# Patient Record
Sex: FEMALE | Race: WHITE | HISPANIC OR LATINO | Employment: FULL TIME | URBAN - METROPOLITAN AREA
[De-identification: names, ages, dates, MRNs, and addresses within clinical notes are randomized per-mention and may not be internally consistent; named-entity substitution may affect disease eponyms.]

---

## 2018-06-25 VITALS
WEIGHT: 140 LBS | RESPIRATION RATE: 15 BRPM | BODY MASS INDEX: 26.43 KG/M2 | OXYGEN SATURATION: 100 % | HEART RATE: 95 BPM | SYSTOLIC BLOOD PRESSURE: 135 MMHG | TEMPERATURE: 97.4 F | HEIGHT: 61 IN | DIASTOLIC BLOOD PRESSURE: 80 MMHG

## 2018-06-25 PROCEDURE — 99283 EMERGENCY DEPT VISIT LOW MDM: CPT

## 2018-06-26 ENCOUNTER — HOSPITAL ENCOUNTER (EMERGENCY)
Facility: HOSPITAL | Age: 23
Discharge: HOME | End: 2018-06-26
Attending: EMERGENCY MEDICINE | Admitting: EMERGENCY MEDICINE

## 2018-06-26 DIAGNOSIS — J02.9 PHARYNGITIS, UNSPECIFIED ETIOLOGY: Primary | ICD-10-CM

## 2018-06-26 LAB — S PYO AG THROAT QL: NEGATIVE

## 2018-06-26 PROCEDURE — 99282 EMERGENCY DEPT VISIT SF MDM: CPT | Performed by: EMERGENCY MEDICINE

## 2018-06-26 PROCEDURE — 87081 CULTURE SCREEN ONLY: CPT | Performed by: EMERGENCY MEDICINE

## 2018-06-26 PROCEDURE — 87880 STREP A ASSAY W/OPTIC: CPT | Performed by: EMERGENCY MEDICINE

## 2018-06-26 RX ORDER — ACETAMINOPHEN 500 MG
1000 TABLET ORAL ONCE
Status: COMPLETED | OUTPATIENT
Start: 2018-06-26 | End: 2018-06-26

## 2018-06-26 RX ADMIN — ACETAMINOPHEN 1000 MG: 500 TABLET, FILM COATED ORAL at 00:35

## 2018-06-26 RX ADMIN — LIDOCAINE HYDROCHLORIDE 5 ML: 20 SOLUTION ORAL; TOPICAL at 01:37

## 2018-06-28 LAB — BACTERIA SPEC AEROBE CULT: NORMAL

## 2018-11-21 ENCOUNTER — OFFICE VISIT (OUTPATIENT)
Dept: OBSTETRICS AND GYNECOLOGY | Facility: CLINIC | Age: 23
End: 2018-11-21

## 2018-11-21 VITALS
SYSTOLIC BLOOD PRESSURE: 110 MMHG | BODY MASS INDEX: 27.38 KG/M2 | WEIGHT: 145 LBS | DIASTOLIC BLOOD PRESSURE: 70 MMHG | HEIGHT: 61 IN

## 2018-11-21 DIAGNOSIS — Z01.419 WELL WOMAN EXAM WITH ROUTINE GYNECOLOGICAL EXAM: Primary | ICD-10-CM

## 2018-11-21 DIAGNOSIS — Z01.419 WELL WOMAN EXAM: ICD-10-CM

## 2018-11-21 LAB
B-HCG UR QL: NEGATIVE
BILIRUB BLD-MCNC: NEGATIVE MG/DL
CLARITY, POC: CLEAR
COLOR UR: YELLOW
GLUCOSE UR STRIP-MCNC: NEGATIVE MG/DL
INTERNAL NEGATIVE CONTROL: NEGATIVE
INTERNAL POSITIVE CONTROL: POSITIVE
KETONES UR QL: NEGATIVE
LEUKOCYTE EST, POC: NEGATIVE
Lab: NORMAL
NITRITE UR-MCNC: NEGATIVE MG/ML
PH UR: 5 [PH] (ref 5–8)
PROT UR STRIP-MCNC: NEGATIVE MG/DL
RBC # UR STRIP: NEGATIVE /UL
SP GR UR: 1 (ref 1–1.03)
UROBILINOGEN UR QL: NORMAL

## 2018-11-21 PROCEDURE — 81002 URINALYSIS NONAUTO W/O SCOPE: CPT | Performed by: OBSTETRICS & GYNECOLOGY

## 2018-11-21 PROCEDURE — 99385 PREV VISIT NEW AGE 18-39: CPT | Performed by: OBSTETRICS & GYNECOLOGY

## 2018-11-21 PROCEDURE — 81025 URINE PREGNANCY TEST: CPT | Performed by: OBSTETRICS & GYNECOLOGY

## 2018-11-21 NOTE — PROGRESS NOTES
"GYN Annual Exam     CC- Here for annual exam.     Pt new to practice? YES  Pt new to me? YES           Miriam Riley is a 23 y.o. female who presents for annual well woman exam. Patient's last menstrual period was 11/05/2018.  Doesn't need contraception.      Problems:  There is no problem list on file for this patient.  ; otherwise none    OB History     No data available            History reviewed. No pertinent past medical history.    History reviewed. No pertinent surgical history.      Current Outpatient Medications:   •  lidocaine viscous (XYLOCAINE) 2 % solution, Take 5 mL by mouth 4 (Four) Times a Day As Needed for Mild Pain ., Disp: 100 mL, Rfl: 0    No Known Allergies    Social History     Tobacco Use   • Smoking status: Never Smoker   Substance Use Topics   • Alcohol use: Not on file   • Drug use: Not on file     Counseling given: Not Answered      History reviewed. No pertinent family history.      @ROS@    /70   Ht 154.9 cm (60.98\")   Wt 65.8 kg (145 lb)   LMP 11/05/2018   BMI 27.41 kg/m²     Physical Exam   Constitutional: She is oriented to person, place, and time. She appears well-developed and well-nourished.   HENT:   Head: Normocephalic and atraumatic.   Neck: Normal range of motion. Neck supple. No thyromegaly present.   Cardiovascular: Normal rate and regular rhythm.   Pulmonary/Chest: Effort normal and breath sounds normal. Right breast exhibits no mass and no nipple discharge. Left breast exhibits no mass and no nipple discharge. Breasts are symmetrical. There is no breast swelling.   Abdominal: Soft. Bowel sounds are normal. She exhibits no distension and no mass. There is no tenderness. There is no rebound and no guarding.   Genitourinary: Vagina normal and uterus normal. No breast tenderness, discharge or bleeding. Pelvic exam was performed with patient prone. There is no lesion on the right labia. There is no lesion on the left labia. Cervix exhibits no motion tenderness and no " discharge. Right adnexum displays no mass. Left adnexum displays no mass.   Genitourinary Comments: cx wnl, pap done   Musculoskeletal: Normal range of motion. She exhibits no edema.   Neurological: She is alert and oriented to person, place, and time.   Skin: Skin is warm and dry.   Breasts wnl bilaterally   Psychiatric: She has a normal mood and affect. Her behavior is normal. Judgment and thought content normal.   Nursing note and vitals reviewed.       As part of wellness and prevention, the following topics were discussed with the patient:  []  Nutrition  []  Physical activity/regular exercise   []  Healthy weight  []  Injury prevention  []  Substance misuse/abuse  []  Sexual behavior  []  STD prevention  []  Contaception  []  Dental health  []  Mental health  []  Immunization  [x]  Encouraged SBE     Counseling and guidance done:  Nutrition, physical activity, healthy weight, injury prevention, misuse of tobacco, alcohol and drugs, sexual behavior and STDs, contraception, dental health, mental health, immunizations breast cancer screening and exams.    Assessment     1) GYN annual well woman exam.   2) PAP done today? yes  3) problems: none       Plan       Follow up prn and one year.    Miriam was seen today for gynecologic exam.    Diagnoses and all orders for this visit:    Well woman exam with routine gynecological exam  -     POC Pregnancy, Urine  -     POC Urinalysis Dipstick  -     Pap IG, Ct-Ng TV Rfx HPV ASCU    Well woman exam        RTO Return in about 1 year (around 11/21/2019) for Annual physical.        Kai Burroughs MD    11/21/2018  11:15 AM

## 2018-11-27 LAB
C TRACH RRNA CVX QL NAA+PROBE: NEGATIVE
CONV .: ABNORMAL
CYTOLOGIST CVX/VAG CYTO: ABNORMAL
CYTOLOGY CVX/VAG DOC THIN PREP: ABNORMAL
DX ICD CODE: ABNORMAL
DX ICD CODE: ABNORMAL
HIV 1 & 2 AB SER-IMP: ABNORMAL
HPV I/H RISK 1 DNA CVX QL PROBE+SIG AMP: POSITIVE
N GONORRHOEA RRNA CVX QL NAA+PROBE: NEGATIVE
OTHER STN SPEC: ABNORMAL
PATH REPORT.FINAL DX SPEC: ABNORMAL
PATHOLOGIST CVX/VAG CYTO: ABNORMAL
RECOM F/U CVX/VAG CYTO: ABNORMAL
STAT OF ADQ CVX/VAG CYTO-IMP: ABNORMAL
T VAGINALIS RRNA SPEC QL NAA+PROBE: NEGATIVE

## 2018-11-28 PROBLEM — R87.810 ASCUS WITH POSITIVE HIGH RISK HPV CERVICAL: Status: ACTIVE | Noted: 2018-11-28

## 2018-11-28 PROBLEM — R87.610 ASCUS WITH POSITIVE HIGH RISK HPV CERVICAL: Status: ACTIVE | Noted: 2018-11-28

## 2019-01-23 ENCOUNTER — OFFICE VISIT (OUTPATIENT)
Dept: OBSTETRICS AND GYNECOLOGY | Facility: CLINIC | Age: 24
End: 2019-01-23

## 2019-01-23 VITALS
DIASTOLIC BLOOD PRESSURE: 64 MMHG | BODY MASS INDEX: 27.38 KG/M2 | WEIGHT: 145 LBS | HEIGHT: 61 IN | SYSTOLIC BLOOD PRESSURE: 100 MMHG

## 2019-01-23 DIAGNOSIS — Z13.9 SCREENING FOR CONDITION: Primary | ICD-10-CM

## 2019-01-23 DIAGNOSIS — R87.610 ASCUS WITH POSITIVE HIGH RISK HPV CERVICAL: ICD-10-CM

## 2019-01-23 DIAGNOSIS — R87.810 ASCUS WITH POSITIVE HIGH RISK HPV CERVICAL: ICD-10-CM

## 2019-01-23 LAB
B-HCG UR QL: NEGATIVE
INTERNAL NEGATIVE CONTROL: NEGATIVE
INTERNAL POSITIVE CONTROL: POSITIVE
Lab: NORMAL

## 2019-01-23 PROCEDURE — 81025 URINE PREGNANCY TEST: CPT | Performed by: OBSTETRICS & GYNECOLOGY

## 2019-01-23 PROCEDURE — 57452 EXAM OF CERVIX W/SCOPE: CPT | Performed by: OBSTETRICS & GYNECOLOGY

## 2019-01-23 NOTE — PROGRESS NOTES
Colposcopy Procedure Note    Indications: Most recent Pap smear showed: ASCUS with POSITIVE high risk HPV.  Prior cervical/vaginal disease: normal exam without visible pathology.  Prior cervical treatment: no treatment.    Procedure Details   The risks and benefits of the procedure and Verbal informed consent obtained.    Speculum placed in vagina and excellent visualization of cervix achieved, cervix swabbed x 3 with acetic acid solution and Lugol's solution     Findings:  Cervix: no visible lesions, no mosaicism, no punctation and no abnormal vasculature; cervix swabbed with Lugol's solution, SCJ visualized 360 degrees without lesions and no biopsies taken.  Vaginal inspection: normal without visible lesions.  Vulvar colposcopy: vulvar colposcopy not performed.    Specimens: none    Complications: none.    PT TOLERATED PROCEDURE WELL.     IMPRESSION:  NEGATIVE, ADEQUATE COLPOSCOPY w/ ASCUS/HPV pap    Plan:  Rpt pap 1 yr.      Kai Burroughs MD  10:01 AM  01/23/19

## 2019-02-06 ENCOUNTER — TRANSCRIBE ORDERS (OUTPATIENT)
Dept: ADMINISTRATIVE | Facility: HOSPITAL | Age: 24
End: 2019-02-06

## 2019-02-06 DIAGNOSIS — K11.8 MIKULICZ'S DISEASE: Primary | ICD-10-CM

## 2019-02-13 ENCOUNTER — HOSPITAL ENCOUNTER (OUTPATIENT)
Dept: CT IMAGING | Facility: HOSPITAL | Age: 24
Discharge: HOME | End: 2019-02-13
Admitting: OTOLARYNGOLOGY

## 2019-02-13 DIAGNOSIS — K11.8 MIKULICZ'S DISEASE: ICD-10-CM

## 2019-02-13 PROCEDURE — 70492 CT SFT TSUE NCK W/O & W/DYE: CPT

## 2019-02-13 PROCEDURE — 25010000002 IOPAMIDOL 61 % SOLUTION: Performed by: OTOLARYNGOLOGY

## 2019-02-13 RX ADMIN — IOPAMIDOL 100 ML: 612 INJECTION, SOLUTION INTRAVENOUS at 08:24

## 2019-04-06 ENCOUNTER — APPOINTMENT (OUTPATIENT)
Dept: CT IMAGING | Facility: HOSPITAL | Age: 24
End: 2019-04-06

## 2019-04-06 ENCOUNTER — HOSPITAL ENCOUNTER (EMERGENCY)
Facility: HOSPITAL | Age: 24
Discharge: HOME | End: 2019-04-06
Attending: EMERGENCY MEDICINE | Admitting: EMERGENCY MEDICINE

## 2019-04-06 VITALS
HEART RATE: 83 BPM | SYSTOLIC BLOOD PRESSURE: 129 MMHG | RESPIRATION RATE: 18 BRPM | OXYGEN SATURATION: 98 % | DIASTOLIC BLOOD PRESSURE: 86 MMHG | BODY MASS INDEX: 30.02 KG/M2 | WEIGHT: 159 LBS | TEMPERATURE: 97.5 F | HEIGHT: 61 IN

## 2019-04-06 DIAGNOSIS — K11.20 SIALOADENITIS: Primary | ICD-10-CM

## 2019-04-06 LAB
ANION GAP SERPL CALCULATED.3IONS-SCNC: 14.4 MMOL/L
BASOPHILS # BLD AUTO: 0.01 10*3/MM3 (ref 0–0.2)
BASOPHILS NFR BLD AUTO: 0.1 % (ref 0–1.5)
BUN BLD-MCNC: 16 MG/DL (ref 6–20)
BUN/CREAT SERPL: 23.9 (ref 7–25)
CALCIUM SPEC-SCNC: 9.1 MG/DL (ref 8.6–10.5)
CHLORIDE SERPL-SCNC: 104 MMOL/L (ref 98–107)
CO2 SERPL-SCNC: 22.6 MMOL/L (ref 22–29)
CREAT BLD-MCNC: 0.67 MG/DL (ref 0.57–1)
DEPRECATED RDW RBC AUTO: 43.9 FL (ref 37–54)
EOSINOPHIL # BLD AUTO: 0.06 10*3/MM3 (ref 0–0.4)
EOSINOPHIL NFR BLD AUTO: 0.6 % (ref 0.3–6.2)
ERYTHROCYTE [DISTWIDTH] IN BLOOD BY AUTOMATED COUNT: 13.5 % (ref 12.3–15.4)
GFR SERPL CREATININE-BSD FRML MDRD: 108 ML/MIN/1.73
GLUCOSE BLD-MCNC: 91 MG/DL (ref 65–99)
HCG SERPL QL: NEGATIVE
HCT VFR BLD AUTO: 41.8 % (ref 34–46.6)
HGB BLD-MCNC: 13.7 G/DL (ref 12–15.9)
IMM GRANULOCYTES # BLD AUTO: 0.02 10*3/MM3 (ref 0–0.05)
IMM GRANULOCYTES NFR BLD AUTO: 0.2 % (ref 0–0.5)
LYMPHOCYTES # BLD AUTO: 3.27 10*3/MM3 (ref 0.7–3.1)
LYMPHOCYTES NFR BLD AUTO: 33.7 % (ref 19.6–45.3)
MCH RBC QN AUTO: 29 PG (ref 26.6–33)
MCHC RBC AUTO-ENTMCNC: 32.8 G/DL (ref 31.5–35.7)
MCV RBC AUTO: 88.6 FL (ref 79–97)
MONOCYTES # BLD AUTO: 0.54 10*3/MM3 (ref 0.1–0.9)
MONOCYTES NFR BLD AUTO: 5.6 % (ref 5–12)
NEUTROPHILS # BLD AUTO: 5.81 10*3/MM3 (ref 1.4–7)
NEUTROPHILS NFR BLD AUTO: 59.8 % (ref 42.7–76)
NRBC BLD AUTO-RTO: 0 /100 WBC (ref 0–0)
PLATELET # BLD AUTO: 308 10*3/MM3 (ref 140–450)
PMV BLD AUTO: 11.8 FL (ref 6–12)
POTASSIUM BLD-SCNC: 3.9 MMOL/L (ref 3.5–5.2)
RBC # BLD AUTO: 4.72 10*6/MM3 (ref 3.77–5.28)
SODIUM BLD-SCNC: 141 MMOL/L (ref 136–145)
WBC NRBC COR # BLD: 9.71 10*3/MM3 (ref 3.4–10.8)

## 2019-04-06 PROCEDURE — 96375 TX/PRO/DX INJ NEW DRUG ADDON: CPT

## 2019-04-06 PROCEDURE — 99283 EMERGENCY DEPT VISIT LOW MDM: CPT

## 2019-04-06 PROCEDURE — 70491 CT SOFT TISSUE NECK W/DYE: CPT

## 2019-04-06 PROCEDURE — 85025 COMPLETE CBC W/AUTO DIFF WBC: CPT | Performed by: EMERGENCY MEDICINE

## 2019-04-06 PROCEDURE — 84703 CHORIONIC GONADOTROPIN ASSAY: CPT | Performed by: EMERGENCY MEDICINE

## 2019-04-06 PROCEDURE — 25010000002 HYDROMORPHONE PER 4 MG: Performed by: EMERGENCY MEDICINE

## 2019-04-06 PROCEDURE — 80048 BASIC METABOLIC PNL TOTAL CA: CPT | Performed by: EMERGENCY MEDICINE

## 2019-04-06 PROCEDURE — 96374 THER/PROPH/DIAG INJ IV PUSH: CPT

## 2019-04-06 PROCEDURE — 25010000002 ONDANSETRON PER 1 MG: Performed by: EMERGENCY MEDICINE

## 2019-04-06 PROCEDURE — 25010000002 DEXAMETHASONE SODIUM PHOSPHATE 10 MG/ML SOLUTION: Performed by: EMERGENCY MEDICINE

## 2019-04-06 PROCEDURE — 25010000002 KETOROLAC TROMETHAMINE PER 15 MG: Performed by: EMERGENCY MEDICINE

## 2019-04-06 PROCEDURE — 0 IOPAMIDOL PER 1 ML: Performed by: EMERGENCY MEDICINE

## 2019-04-06 PROCEDURE — 99282 EMERGENCY DEPT VISIT SF MDM: CPT | Performed by: EMERGENCY MEDICINE

## 2019-04-06 RX ORDER — ONDANSETRON 2 MG/ML
4 INJECTION INTRAMUSCULAR; INTRAVENOUS ONCE
Status: COMPLETED | OUTPATIENT
Start: 2019-04-06 | End: 2019-04-06

## 2019-04-06 RX ORDER — KETOROLAC TROMETHAMINE 30 MG/ML
30 INJECTION, SOLUTION INTRAMUSCULAR; INTRAVENOUS ONCE
Status: COMPLETED | OUTPATIENT
Start: 2019-04-06 | End: 2019-04-06

## 2019-04-06 RX ORDER — CEPHALEXIN 500 MG/1
500 CAPSULE ORAL 2 TIMES DAILY
Qty: 21 CAPSULE | Refills: 0 | Status: SHIPPED | OUTPATIENT
Start: 2019-04-06 | End: 2019-04-17

## 2019-04-06 RX ORDER — ACETAMINOPHEN 500 MG
1000 TABLET ORAL EVERY 6 HOURS PRN
COMMUNITY
End: 2019-11-26

## 2019-04-06 RX ORDER — DEXAMETHASONE SODIUM PHOSPHATE 10 MG/ML
10 INJECTION INTRAMUSCULAR; INTRAVENOUS ONCE
Status: COMPLETED | OUTPATIENT
Start: 2019-04-06 | End: 2019-04-06

## 2019-04-06 RX ORDER — SODIUM CHLORIDE 9 MG/ML
INJECTION, SOLUTION INTRAVENOUS
Status: DISPENSED
Start: 2019-04-06 | End: 2019-04-06

## 2019-04-06 RX ORDER — HYDROCODONE BITARTRATE AND ACETAMINOPHEN 5; 325 MG/1; MG/1
TABLET ORAL
Qty: 20 TABLET | Refills: 0 | Status: SHIPPED | OUTPATIENT
Start: 2019-04-06 | End: 2019-11-26

## 2019-04-06 RX ORDER — HYDROMORPHONE HCL 110MG/55ML
1 PATIENT CONTROLLED ANALGESIA SYRINGE INTRAVENOUS ONCE
Status: COMPLETED | OUTPATIENT
Start: 2019-04-06 | End: 2019-04-06

## 2019-04-06 RX ADMIN — DEXAMETHASONE SODIUM PHOSPHATE 10 MG: 10 INJECTION INTRAMUSCULAR; INTRAVENOUS at 08:05

## 2019-04-06 RX ADMIN — IOPAMIDOL 100 ML: 755 INJECTION, SOLUTION INTRAVENOUS at 08:40

## 2019-04-06 RX ADMIN — HYDROMORPHONE HYDROCHLORIDE 1 MG: 2 INJECTION, SOLUTION INTRAMUSCULAR; INTRAVENOUS; SUBCUTANEOUS at 07:52

## 2019-04-06 RX ADMIN — SODIUM CHLORIDE 1000 ML: 9 INJECTION, SOLUTION INTRAVENOUS at 07:47

## 2019-04-06 RX ADMIN — KETOROLAC TROMETHAMINE 30 MG: 30 INJECTION, SOLUTION INTRAMUSCULAR at 08:53

## 2019-04-06 RX ADMIN — ONDANSETRON 4 MG: 2 INJECTION, SOLUTION INTRAMUSCULAR; INTRAVENOUS at 07:47

## 2019-04-06 NOTE — DISCHARGE INSTRUCTIONS
Drink plenty of fluids.  Follow-up with Dr. Brown on Monday.  Return to the emergency department if there is worsening pain, fever, shortness of breath, difficulty swallowing or speaking, worse in any way at all.  Take Motrin as needed as directed for pain and inflammation.  Take the Norco if the ibuprofen does not control your pain.  Take Colace as needed as directed when you are taking the Norco for constipation.

## 2019-04-06 NOTE — ED PROVIDER NOTES
Subjective   History of Present Illness  History of Present Illness    Chief complaint: Swelling of the neck and pain    Location: Under the right mandible    Quality/Severity: Severe, sharp    Timing/Onset/Duration: Gradual onset since Thursday    Modifying Factors: It hurts to open her mouth and press under the right side of the mandible under the right portion of the mandible    Associated Symptoms: No fever or chills.  No nausea or vomiting.  No shortness of breath.  No drainage.    Narrative: This 23-year-old  female had stones removed from her salivary gland on Thursday.  This was performed by a Dr. Alexy Brown.  Patient presents with swelling of the right side of the mandible.  Patient attempted to call the surgeon to perform the procedure and there was no answer.  Patient has a follow-up appointment at the [facility] office on Wednesday, April 17 at 8:30 AM.      Review of Systems   Constitutional: Negative for chills and fever.   HENT: Positive for sore throat. Negative for trouble swallowing and voice change.    Respiratory: Negative for shortness of breath.    Neurological: Negative for headaches.        Medication List      ASK your doctor about these medications    acetaminophen 500 MG tablet  Commonly known as:  TYLENOL     lidocaine viscous 2 % solution  Commonly known as:  XYLOCAINE  Take 5 mL by mouth 4 (Four) Times a Day As Needed for Mild Pain .          History reviewed. No pertinent past medical history.    No Known Allergies    Past Surgical History:   Procedure Laterality Date   • SALIVARY GLAND EXCISION         Family History   Problem Relation Age of Onset   • Hypertension Mother    • Diabetes Father    • No Known Problems Sister    • No Known Problems Brother    • No Known Problems Son    • No Known Problems Daughter    • No Known Problems Maternal Grandmother    • No Known Problems Maternal Grandfather    • No Known Problems Paternal Grandmother    • No Known Problems Paternal  Grandfather    • No Known Problems Cousin        Social History     Socioeconomic History   • Marital status:      Spouse name: Not on file   • Number of children: Not on file   • Years of education: Not on file   • Highest education level: Not on file   Tobacco Use   • Smoking status: Never Smoker   • Smokeless tobacco: Never Used   Substance and Sexual Activity   • Alcohol use: No     Frequency: Never   • Drug use: No   • Sexual activity: Defer           Objective   Physical Exam   Constitutional: She is oriented to person, place, and time. She appears well-developed and well-nourished. She appears distressed (Patient appears to be in pain).   ED Triage Vitals  Temp: 97.5 °F (36.4 °C) (04/06/19 0705)  Heart Rate: 83 (04/06/19 0705)  Resp: 18 (04/06/19 0705)  BP: 133/89 (04/06/19 0705)  SpO2: 98 % (04/06/19 0709)  Temp src: Oral (04/06/19 0705)  Heart Rate Source: Monitor (04/06/19 0705)  Patient Position: Sitting (04/06/19 0705)  BP Location: Right arm (04/06/19 0705)  FiO2 (%): n/a    The patient's vitals were reviewed by me.  Unless otherwise noted they are within normal limits.     HENT:   Head: Normocephalic.   Right Ear: External ear normal.   Left Ear: External ear normal.   Nose: Nose normal.   Mouth/Throat: Oropharynx is clear and moist. No oropharyngeal exudate.   Neck:   There is tenderness and swelling under the right mandible.  The patient appears to have trismus.   Pulmonary/Chest: Effort normal and breath sounds normal. No stridor. No respiratory distress. She has no wheezes. She has no rales. She exhibits no tenderness.   Neurological: She is alert and oriented to person, place, and time.   Skin: Skin is warm and dry.   Nursing note and vitals reviewed.      Procedures           ED Course  ED Course as of Apr 06 0853   Sat Apr 06, 2019   0842 Laboratory values were reviewed by me.  They are unremarkable.  [RC]      ED Course User Index  [RC] Bandar Odom MD      9:53 AM, 04/06/19:  The  patient was reassessed.  She feels better.  Her vital signs were reviewed and are stable.  There is no stridor.  The patient is able to open and close her mouth and tolerate clear liquids.  There is no shortness of breath.    9:53 AM, 04/06/19:  The patient's diagnosis of sialoadenitis was discussed with her.  The patient should take ibuprofen as needed as directed for pain.  She should take the Norco as needed as directed if the ibuprofen does not control her pain.  She should take her antibiotics as prescribed.  The patient should follow-up with Dr. Brown on Monday.  The patient should return to the emergency department if there is worsening pain, shortness of breath, difficulty swallowing or speaking, worse in any way at all.  All the patient's questions were answered she will be discharged in good condition.            MDM    CT Soft Tissue Neck With Contrast    (Results Pending)     Labs Reviewed   BASIC METABOLIC PANEL   CBC WITH AUTO DIFFERENTIAL   HCG, SERUM, QUALITATIVE   CBC AND DIFFERENTIAL    Narrative:     The following orders were created for panel order CBC & Differential.  Procedure                               Abnormality         Status                     ---------                               -----------         ------                     CBC Auto Differential[[id number]]                                                         Please view results for these tests on the individual orders.     No results found.    Final diagnoses:   Sialoadenitis         ED Medications:  Medications   sodium chloride 0.9 % bolus 1,000 mL (not administered)   HYDROmorphone (DILAUDID) injection 1 mg (not administered)   ondansetron (ZOFRAN) injection 4 mg (not administered)       New Medications:     Medication List      ASK your doctor about these medications    acetaminophen 500 MG tablet  Commonly known as:  TYLENOL     lidocaine viscous 2 % solution  Commonly known as:  XYLOCAINE  Take 5 mL by mouth 4 (Four)  Times a Day As Needed for Mild Pain .          Stopped Medications:     Medication List      ASK your doctor about these medications    acetaminophen 500 MG tablet  Commonly known as:  TYLENOL     lidocaine viscous 2 % solution  Commonly known as:  XYLOCAINE  Take 5 mL by mouth 4 (Four) Times a Day As Needed for Mild Pain .            Final diagnoses:   Sialoadenitis            Bandar Odom MD  04/06/19 0915

## 2019-11-26 ENCOUNTER — OFFICE VISIT (OUTPATIENT)
Dept: OBSTETRICS AND GYNECOLOGY | Facility: CLINIC | Age: 24
End: 2019-11-26

## 2019-11-26 VITALS
SYSTOLIC BLOOD PRESSURE: 110 MMHG | BODY MASS INDEX: 28.89 KG/M2 | HEIGHT: 61 IN | WEIGHT: 153 LBS | DIASTOLIC BLOOD PRESSURE: 70 MMHG

## 2019-11-26 DIAGNOSIS — Z01.419 WELL WOMAN EXAM: ICD-10-CM

## 2019-11-26 DIAGNOSIS — Z01.419 ROUTINE GYNECOLOGICAL EXAMINATION: ICD-10-CM

## 2019-11-26 DIAGNOSIS — Z01.419 PAP SMEAR, LOW-RISK: Primary | ICD-10-CM

## 2019-11-26 DIAGNOSIS — Z11.51 SPECIAL SCREENING EXAMINATION FOR HUMAN PAPILLOMAVIRUS (HPV): ICD-10-CM

## 2019-11-26 LAB
B-HCG UR QL: NEGATIVE
BILIRUB BLD-MCNC: NEGATIVE MG/DL
CLARITY, POC: CLEAR
COLOR UR: YELLOW
GLUCOSE UR STRIP-MCNC: NEGATIVE MG/DL
INTERNAL NEGATIVE CONTROL: NEGATIVE
INTERNAL POSITIVE CONTROL: POSITIVE
KETONES UR QL: NEGATIVE
LEUKOCYTE EST, POC: NEGATIVE
Lab: 3215
NITRITE UR-MCNC: NEGATIVE MG/ML
PH UR: 5 [PH] (ref 5–8)
PROT UR STRIP-MCNC: NEGATIVE MG/DL
RBC # UR STRIP: NEGATIVE /UL
SP GR UR: 1 (ref 1–1.03)
UROBILINOGEN UR QL: NORMAL

## 2019-11-26 PROCEDURE — 81002 URINALYSIS NONAUTO W/O SCOPE: CPT | Performed by: OBSTETRICS & GYNECOLOGY

## 2019-11-26 PROCEDURE — 81025 URINE PREGNANCY TEST: CPT | Performed by: OBSTETRICS & GYNECOLOGY

## 2019-11-26 PROCEDURE — 99395 PREV VISIT EST AGE 18-39: CPT | Performed by: OBSTETRICS & GYNECOLOGY

## 2019-11-26 NOTE — PROGRESS NOTES
"GYN Annual Exam     CC- Here for annual exam.     Pt new to practice? No  Pt new to me? No     HPI: History of Present Illness      Miriam Riley is a 24 y.o. female who presents for annual well woman exam. Patient's last menstrual period was 11/11/2019.    Problems in addition to need for annual: none.  Had abnormal pap last year with neg colposcopy.     MAMMOGRAM UP TO DATE IF AGE APPROPRIATE?  Yes    COLONOSCOPY UP TO DATE IF AGE APPROPRIATE? No    Fhx breast cancer? No    Fhx ovarian cancer? No    Fhx colon cancer? No    Invitae testing offered? No      PMHX:  Patient Active Problem List   Diagnosis   • ASCUS with positive high risk HPV cervical   ; otherwise none    OB History     No data available            History reviewed. No pertinent past medical history.    Past Surgical History:   Procedure Laterality Date   • SALIVARY GLAND EXCISION         No current outpatient medications on file.    No Known Allergies    Social History     Tobacco Use   • Smoking status: Never Smoker   • Smokeless tobacco: Never Used   Substance Use Topics   • Alcohol use: No     Frequency: Never   • Drug use: No       Smoker: No    Family History   Problem Relation Age of Onset   • Hypertension Mother    • Diabetes Father    • No Known Problems Sister    • No Known Problems Brother    • No Known Problems Son    • No Known Problems Daughter    • No Known Problems Maternal Grandmother    • No Known Problems Maternal Grandfather    • No Known Problems Paternal Grandmother    • No Known Problems Paternal Grandfather    • No Known Problems Cousin        Review of Systems        EXAM:  /70   Ht 154.9 cm (61\")   Wt 69.4 kg (153 lb)   LMP 11/11/2019   Breastfeeding? No   BMI 28.91 kg/m²     Physical Exam   Constitutional: She is oriented to person, place, and time. She appears well-developed and well-nourished. No distress.   HENT:   Head: Normocephalic and atraumatic.   Eyes: EOM are normal.   Neck: Normal range of motion. "   Cardiovascular: Normal rate.   Pulmonary/Chest: Effort normal.   Abdominal: Soft. She exhibits no distension and no mass. There is no tenderness. There is no guarding.   Genitourinary: Vagina normal and uterus normal. No vaginal discharge found.   Genitourinary Comments: cx wnl, pap done   Musculoskeletal: Normal range of motion. She exhibits no edema, tenderness or deformity.   Neurological: She is alert and oriented to person, place, and time.   Skin: Skin is warm and dry. No rash noted. She is not diaphoretic. No erythema. No pallor.   Breasts wnl bilaterally   Psychiatric: She has a normal mood and affect. Her behavior is normal. Judgment and thought content normal.   Nursing note and vitals reviewed.         As part of wellness and prevention, the following topics were discussed with the patient:  []  Nutrition  []  Physical activity/regular exercise   [x]  Healthy weight  []  Injury prevention  [x]  Substance misuse/abuse  []  Sexual behavior  []  STD prevention  []  Contaception  []  Dental health  [x]  Mental health  []  Immunization  [x]  Encouraged SBE     Counseling and guidance done:  Nutrition, physical activity, healthy weight, injury prevention, misuse of tobacco, alcohol and drugs, sexual behavior and STDs, contraception, dental health, mental health, immunizations breast cancer screening and exams.    Assessment     1) GYN annual well woman exam.   2) PAP done today? Yes  3) problems addressed: none       Plan       Follow up prn or one year.    Miriam was seen today for gynecologic exam.    Diagnoses and all orders for this visit:    Pap smear, low-risk  -     POC Urinalysis Dipstick  -     POC Pregnancy, Urine  -     Pap IG, Ct-Ng TV Rfx HPV ASCU    Routine gynecological examination  -     POC Urinalysis Dipstick  -     POC Pregnancy, Urine  -     Pap IG, Ct-Ng TV Rfx HPV ASCU    Special screening examination for human papillomavirus (HPV)  -     POC Urinalysis Dipstick  -     POC Pregnancy,  Urine  -     Pap IG, Ct-Ng TV Rfx HPV ASCU    Well woman exam        RTO Return in about 1 year (around 11/26/2020) for Annual physical.        Kai Burroughs MD  [unfilled]  9:55 AM

## 2019-12-02 LAB
C TRACH RRNA CVX QL NAA+PROBE: NEGATIVE
CONV .: ABNORMAL
CYTOLOGIST CVX/VAG CYTO: ABNORMAL
CYTOLOGY CVX/VAG DOC CYTO: ABNORMAL
CYTOLOGY CVX/VAG DOC THIN PREP: ABNORMAL
DX ICD CODE: ABNORMAL
DX ICD CODE: ABNORMAL
HIV 1 & 2 AB SER-IMP: ABNORMAL
N GONORRHOEA RRNA CVX QL NAA+PROBE: NEGATIVE
OTHER STN SPEC: ABNORMAL
PATHOLOGIST CVX/VAG CYTO: ABNORMAL
RECOM F/U CVX/VAG CYTO: ABNORMAL
STAT OF ADQ CVX/VAG CYTO-IMP: ABNORMAL
T VAGINALIS RRNA SPEC QL NAA+PROBE: NEGATIVE

## 2019-12-08 PROBLEM — R87.612 LGSIL ON PAP SMEAR OF CERVIX: Status: ACTIVE | Noted: 2019-12-08

## 2020-01-07 ENCOUNTER — OFFICE VISIT (OUTPATIENT)
Dept: OBSTETRICS AND GYNECOLOGY | Facility: CLINIC | Age: 25
End: 2020-01-07

## 2020-01-07 VITALS
HEIGHT: 61 IN | SYSTOLIC BLOOD PRESSURE: 110 MMHG | BODY MASS INDEX: 28.7 KG/M2 | WEIGHT: 152 LBS | DIASTOLIC BLOOD PRESSURE: 70 MMHG

## 2020-01-07 DIAGNOSIS — R87.810 ASCUS WITH POSITIVE HIGH RISK HPV CERVICAL: ICD-10-CM

## 2020-01-07 DIAGNOSIS — R87.612 LOW GRADE SQUAMOUS INTRAEPITHELIAL LESION ON CYTOLOGIC SMEAR OF CERVIX (LGSIL): Primary | ICD-10-CM

## 2020-01-07 DIAGNOSIS — R87.610 ASCUS WITH POSITIVE HIGH RISK HPV CERVICAL: ICD-10-CM

## 2020-01-07 PROCEDURE — 57452 EXAM OF CERVIX W/SCOPE: CPT | Performed by: OBSTETRICS & GYNECOLOGY

## 2020-01-07 PROCEDURE — 81025 URINE PREGNANCY TEST: CPT | Performed by: OBSTETRICS & GYNECOLOGY

## 2020-01-07 NOTE — PROGRESS NOTES
Colposcopy Procedure Note    Indications: Most recent Pap smear showed: ASCUS with POSITIVE high risk HPV.  Prior cervical/vaginal disease: unsure.  Prior cervical treatment: no treatment.    Procedure Details   The risks and benefits of the procedure and Verbal informed consent obtained.    Speculum placed in vagina and excellent visualization of cervix achieved, cervix swabbed x 3 with acetic acid solution and Lugol's solution     Findings:  Cervix: no visible lesions, no mosaicism, no punctation and no abnormal vasculature; cervix swabbed with Lugol's solution, SCJ visualized 360 degrees without lesions and no biopsies taken.  Vaginal inspection: vaginal colposcopy not performed.  Vulvar colposcopy: vulvar colposcopy not performed.    Specimens: none    Complications: none.    PT TOLERATED PROCEDURE WELL.     IMPRESSION:  NEGATIVE, ADEQUATE COLPOSCOPY  ASCUS, HRHPV, nonsmoker.     Counseled.     Plan:  Rpt pap 1 yr      Kai Burroughs MD  9:59 AM  01/07/20

## 2020-12-08 ENCOUNTER — OFFICE VISIT (OUTPATIENT)
Dept: OBSTETRICS AND GYNECOLOGY | Facility: CLINIC | Age: 25
End: 2020-12-08

## 2020-12-08 VITALS
BODY MASS INDEX: 25.19 KG/M2 | WEIGHT: 151.2 LBS | HEIGHT: 65 IN | DIASTOLIC BLOOD PRESSURE: 64 MMHG | SYSTOLIC BLOOD PRESSURE: 112 MMHG

## 2020-12-08 DIAGNOSIS — Z01.419 WELL WOMAN EXAM: ICD-10-CM

## 2020-12-08 DIAGNOSIS — Z11.51 ENCOUNTER FOR SCREENING FOR HUMAN PAPILLOMAVIRUS (HPV): ICD-10-CM

## 2020-12-08 DIAGNOSIS — Z13.9 SCREENING FOR CONDITION: Primary | ICD-10-CM

## 2020-12-08 DIAGNOSIS — R87.612 LGSIL ON PAP SMEAR OF CERVIX: ICD-10-CM

## 2020-12-08 DIAGNOSIS — Z01.419 PAP SMEAR, LOW-RISK: ICD-10-CM

## 2020-12-08 PROBLEM — R87.810 ASCUS WITH POSITIVE HIGH RISK HPV CERVICAL: Status: RESOLVED | Noted: 2018-11-28 | Resolved: 2020-12-08

## 2020-12-08 PROBLEM — R87.610 ASCUS WITH POSITIVE HIGH RISK HPV CERVICAL: Status: RESOLVED | Noted: 2018-11-28 | Resolved: 2020-12-08

## 2020-12-08 LAB
B-HCG UR QL: NEGATIVE
BILIRUB BLD-MCNC: NEGATIVE MG/DL
CLARITY, POC: CLEAR
COLOR UR: YELLOW
GLUCOSE UR STRIP-MCNC: NEGATIVE MG/DL
INTERNAL NEGATIVE CONTROL: NEGATIVE
INTERNAL POSITIVE CONTROL: POSITIVE
KETONES UR QL: NEGATIVE
LEUKOCYTE EST, POC: NEGATIVE
Lab: 55
NITRITE UR-MCNC: NEGATIVE MG/ML
PH UR: 5 [PH] (ref 5–8)
PROT UR STRIP-MCNC: NEGATIVE MG/DL
RBC # UR STRIP: NEGATIVE /UL
SP GR UR: 1 (ref 1–1.03)
UROBILINOGEN UR QL: NORMAL

## 2020-12-08 PROCEDURE — 99395 PREV VISIT EST AGE 18-39: CPT | Performed by: OBSTETRICS & GYNECOLOGY

## 2020-12-08 PROCEDURE — 81002 URINALYSIS NONAUTO W/O SCOPE: CPT | Performed by: OBSTETRICS & GYNECOLOGY

## 2020-12-08 PROCEDURE — 81025 URINE PREGNANCY TEST: CPT | Performed by: OBSTETRICS & GYNECOLOGY

## 2020-12-08 NOTE — PROGRESS NOTES
"GYN Annual Exam     CC- Here for annual exam.     Pt new to practice? No  Pt new to me? No     Miriam Riley is a 25 y.o. No obstetric history on file. female who presents for annual well woman exam. Patient's last menstrual period was 11/08/2020.    Problems in addition to need for annual: none.    HPI: History of Present Illness    PMHX:  Patient Active Problem List   Diagnosis   • LGSIL on Pap smear of cervix   ; otherwise none    OB History    No obstetric history on file.           History reviewed. No pertinent past medical history.    Past Surgical History:   Procedure Laterality Date   • SALIVARY GLAND EXCISION         No current outpatient medications on file.    No Known Allergies    Social History     Tobacco Use   • Smoking status: Never Smoker   • Smokeless tobacco: Never Used   Substance Use Topics   • Alcohol use: No     Frequency: Never   • Drug use: No       Smoker: No    Family History   Problem Relation Age of Onset   • Hypertension Mother    • Diabetes Father    • No Known Problems Sister    • No Known Problems Brother    • No Known Problems Son    • No Known Problems Daughter    • No Known Problems Maternal Grandmother    • No Known Problems Maternal Grandfather    • No Known Problems Paternal Grandmother    • No Known Problems Paternal Grandfather    • No Known Problems Cousin        Review of Systems        EXAM:  /64   Ht 165.1 cm (65\")   Wt 68.6 kg (151 lb 3.2 oz)   LMP 11/08/2020   Breastfeeding No   BMI 25.16 kg/m²     Physical Exam  Vitals signs and nursing note reviewed. Exam conducted with a chaperone present.   Constitutional:       General: She is not in acute distress.     Appearance: She is well-developed. She is not diaphoretic.   HENT:      Head: Normocephalic and atraumatic.      Nose: Nose normal.   Eyes:      Extraocular Movements: Extraocular movements intact.   Neck:      Musculoskeletal: Normal range of motion.   Cardiovascular:      Rate and Rhythm: Normal " rate.   Pulmonary:      Effort: Pulmonary effort is normal.   Chest:      Breasts: Breasts are symmetrical.         Right: Normal. No mass, nipple discharge, skin change or tenderness.         Left: Normal. No mass, nipple discharge, skin change or tenderness.   Abdominal:      General: There is no distension.      Palpations: Abdomen is soft. There is no mass.      Tenderness: There is no abdominal tenderness. There is no guarding.   Genitourinary:     General: Normal vulva.      Pubic Area: No rash.       Vagina: Normal. No vaginal discharge.      Cervix: Normal.      Uterus: Normal.       Adnexa: Right adnexa normal and left adnexa normal.      Comments: Pap done  Musculoskeletal: Normal range of motion.         General: No tenderness or deformity.   Lymphadenopathy:      Upper Body:      Right upper body: No axillary adenopathy.      Left upper body: No axillary adenopathy.   Skin:     General: Skin is warm and dry.      Coloration: Skin is not pale.      Findings: No erythema or rash.   Neurological:      Mental Status: She is alert and oriented to person, place, and time.   Psychiatric:         Behavior: Behavior normal.         Thought Content: Thought content normal.         Judgment: Judgment normal.            As part of wellness and prevention, the following topics were discussed with the patient:  []  Nutrition  []  Physical activity/regular exercise   [x]  Healthy weight  []  Injury prevention  [x]  Substance misuse/abuse  []  Sexual behavior  []  STD prevention  []  Contaception  []  Dental health  [x]  Mental health  []  Immunization  [x]  Encouraged SBE     Counseling and guidance done:  Nutrition, physical activity, healthy weight, injury prevention, misuse of tobacco, alcohol and drugs, sexual behavior and STDs, contraception, dental health, mental health, immunizations breast cancer screening and exams.    Assessment     1) GYN annual well woman exam.   2) PAP done today? Yes  3) problems  addressed: none               Plan       Follow up prn or one year.    Diagnoses and all orders for this visit:    1. Screening for condition (Primary)  -     POC Urinalysis Dipstick  -     POC Pregnancy, Urine    2. Pap smear, low-risk  -     Pap IG, Ct-Ng TV Rfx HPV ASCU    3. Encounter for screening for human papillomavirus (HPV)  -     Pap IG, Ct-Ng TV Rfx HPV ASCU    4. Well woman exam    5. LGSIL on Pap smear of cervix        RTO Return in about 1 year (around 12/8/2021) for Annual physical.          Kai Burroughs MD  [unfilled]  09:46 EST

## 2020-12-15 LAB
C TRACH RRNA CVX QL NAA+PROBE: NEGATIVE
CONV .: ABNORMAL
CYTOLOGIST CVX/VAG CYTO: ABNORMAL
CYTOLOGY CVX/VAG DOC CYTO: ABNORMAL
CYTOLOGY CVX/VAG DOC THIN PREP: ABNORMAL
DX ICD CODE: ABNORMAL
DX ICD CODE: ABNORMAL
HIV 1 & 2 AB SER-IMP: ABNORMAL
HPV I/H RISK 1 DNA CVX QL PROBE+SIG AMP: POSITIVE
Lab: ABNORMAL
N GONORRHOEA RRNA CVX QL NAA+PROBE: NEGATIVE
OTHER STN SPEC: ABNORMAL
PATHOLOGIST CVX/VAG CYTO: ABNORMAL
RECOM F/U CVX/VAG CYTO: ABNORMAL
STAT OF ADQ CVX/VAG CYTO-IMP: ABNORMAL
T VAGINALIS RRNA SPEC QL NAA+PROBE: NEGATIVE

## 2020-12-28 ENCOUNTER — TELEPHONE (OUTPATIENT)
Dept: OBSTETRICS AND GYNECOLOGY | Facility: CLINIC | Age: 25
End: 2020-12-28

## 2021-06-09 ENCOUNTER — OFFICE VISIT (OUTPATIENT)
Dept: OBSTETRICS AND GYNECOLOGY | Facility: CLINIC | Age: 26
End: 2021-06-09

## 2021-06-09 VITALS
HEIGHT: 61 IN | SYSTOLIC BLOOD PRESSURE: 110 MMHG | DIASTOLIC BLOOD PRESSURE: 62 MMHG | WEIGHT: 152 LBS | BODY MASS INDEX: 28.7 KG/M2

## 2021-06-09 DIAGNOSIS — Z11.51 SPECIAL SCREENING EXAMINATION FOR HUMAN PAPILLOMAVIRUS (HPV): ICD-10-CM

## 2021-06-09 DIAGNOSIS — Z13.89 SCREENING FOR GENITOURINARY CONDITION: Primary | ICD-10-CM

## 2021-06-09 DIAGNOSIS — Z01.419 PAP SMEAR, LOW-RISK: ICD-10-CM

## 2021-06-09 LAB
B-HCG UR QL: NEGATIVE
BILIRUB BLD-MCNC: NEGATIVE MG/DL
CLARITY, POC: CLEAR
COLOR UR: YELLOW
GLUCOSE UR STRIP-MCNC: NEGATIVE MG/DL
INTERNAL NEGATIVE CONTROL: NORMAL
INTERNAL POSITIVE CONTROL: NORMAL
KETONES UR QL: NEGATIVE
LEUKOCYTE EST, POC: NEGATIVE
Lab: NORMAL
NITRITE UR-MCNC: NEGATIVE MG/ML
PH UR: 5 [PH] (ref 5–8)
PROT UR STRIP-MCNC: NEGATIVE MG/DL
RBC # UR STRIP: NEGATIVE /UL
SP GR UR: 1 (ref 1–1.03)
UROBILINOGEN UR QL: NORMAL

## 2021-06-09 PROCEDURE — 81025 URINE PREGNANCY TEST: CPT | Performed by: OBSTETRICS & GYNECOLOGY

## 2021-06-09 PROCEDURE — 99212 OFFICE O/P EST SF 10 MIN: CPT | Performed by: OBSTETRICS & GYNECOLOGY

## 2021-06-09 PROCEDURE — 81002 URINALYSIS NONAUTO W/O SCOPE: CPT | Performed by: OBSTETRICS & GYNECOLOGY

## 2021-06-09 NOTE — PROGRESS NOTES
"Recheck pap.    S:  Pt here for rpt pap.  Prior paps have shown ASCUS/HRHPV.  Neg colpo.  No prior biopsies. Pt is nonsmoker.  Pt is without complaints.     O:  /62   Ht 154.9 cm (61\")   Wt 68.9 kg (152 lb)   LMP 05/27/2021   Breastfeeding No   BMI 28.72 kg/m²     Exam neg.  Pap done.  cx wnl.     A:  Normal cx, hx ASCUS.     P:  Call for results.  If worse than ASCUS, pt may need rpt colpo/bxs.    Kai Burroughs MD  09:33 EDT  06/09/21        "

## 2021-06-15 LAB
C TRACH RRNA CVX QL NAA+PROBE: NEGATIVE
CONV .: ABNORMAL
CYTOLOGIST CVX/VAG CYTO: ABNORMAL
CYTOLOGY CVX/VAG DOC CYTO: ABNORMAL
CYTOLOGY CVX/VAG DOC THIN PREP: ABNORMAL
DX ICD CODE: ABNORMAL
DX ICD CODE: ABNORMAL
HIV 1 & 2 AB SER-IMP: ABNORMAL
HPV I/H RISK 4 DNA CVX QL PROBE+SIG AMP: POSITIVE
N GONORRHOEA RRNA CVX QL NAA+PROBE: NEGATIVE
OTHER STN SPEC: ABNORMAL
PATHOLOGIST CVX/VAG CYTO: ABNORMAL
RECOM F/U CVX/VAG CYTO: ABNORMAL
STAT OF ADQ CVX/VAG CYTO-IMP: ABNORMAL
T VAGINALIS RRNA SPEC QL NAA+PROBE: NEGATIVE

## 2022-06-15 ENCOUNTER — OFFICE VISIT (OUTPATIENT)
Dept: OBSTETRICS AND GYNECOLOGY | Facility: CLINIC | Age: 27
End: 2022-06-15

## 2022-06-15 VITALS
SYSTOLIC BLOOD PRESSURE: 110 MMHG | BODY MASS INDEX: 30.4 KG/M2 | DIASTOLIC BLOOD PRESSURE: 70 MMHG | WEIGHT: 161 LBS | HEIGHT: 61 IN

## 2022-06-15 DIAGNOSIS — Z01.419 PAP SMEAR, LOW-RISK: Primary | ICD-10-CM

## 2022-06-15 DIAGNOSIS — Z01.419 ROUTINE GYNECOLOGICAL EXAMINATION: ICD-10-CM

## 2022-06-15 DIAGNOSIS — R87.612 LGSIL ON PAP SMEAR OF CERVIX: ICD-10-CM

## 2022-06-15 DIAGNOSIS — Z01.419 WELL WOMAN EXAM: ICD-10-CM

## 2022-06-15 LAB
B-HCG UR QL: NEGATIVE
BILIRUB BLD-MCNC: NEGATIVE MG/DL
CLARITY, POC: CLEAR
COLOR UR: YELLOW
EXPIRATION DATE: NORMAL
GLUCOSE UR STRIP-MCNC: NEGATIVE MG/DL
INTERNAL NEGATIVE CONTROL: NORMAL
INTERNAL POSITIVE CONTROL: NORMAL
KETONES UR QL: NEGATIVE
LEUKOCYTE EST, POC: NEGATIVE
Lab: NORMAL
NITRITE UR-MCNC: NEGATIVE MG/ML
PH UR: 5 [PH] (ref 5–8)
PROT UR STRIP-MCNC: NEGATIVE MG/DL
RBC # UR STRIP: NEGATIVE /UL
SP GR UR: 1 (ref 1–1.03)
UROBILINOGEN UR QL: NORMAL

## 2022-06-15 PROCEDURE — 81025 URINE PREGNANCY TEST: CPT | Performed by: OBSTETRICS & GYNECOLOGY

## 2022-06-15 PROCEDURE — 99395 PREV VISIT EST AGE 18-39: CPT | Performed by: OBSTETRICS & GYNECOLOGY

## 2022-06-15 PROCEDURE — 81002 URINALYSIS NONAUTO W/O SCOPE: CPT | Performed by: OBSTETRICS & GYNECOLOGY

## 2022-06-15 NOTE — PROGRESS NOTES
GYN Annual Exam     CC- Here for annual exam   Chief Complaint   Patient presents with   • Gynecologic Exam       Pt new to practice? No  Pt new to me? No     Miriam Riley is a 26 y.o.  female who presents for annual well woman exam. Patient's last menstrual period was 2022.    Problems in addition to need for annual: none    HPI: History of Present Illness    PMHX:  Patient Active Problem List   Diagnosis   • hx: LGSIL on Pap smear of cervix   ; otherwise none    OB History        0    Para   0    Term   0       0    AB   0    Living   0       SAB   0    IAB   0    Ectopic   0    Molar   0    Multiple   0    Live Births   0                  Past Medical History:   Diagnosis Date   • Abnormal Pap smear of cervix    • Varicella        Past Surgical History:   Procedure Laterality Date   • SALIVARY GLAND EXCISION           Current Outpatient Medications:   •  amoxicillin (AMOXIL) 875 MG tablet, Take 1 by mouth twice a day, Disp: 20 tablet, Rfl: 0  •  predniSONE (DELTASONE) 50 MG tablet, Take 1 tablet po every morning, Disp: 5 tablet, Rfl: 0    No Known Allergies    Social History     Tobacco Use   • Smoking status: Never Smoker   • Smokeless tobacco: Never Used   Vaping Use   • Vaping Use: Never used   Substance Use Topics   • Alcohol use: No   • Drug use: No       Miriam Riley  reports that she has never smoked. She has never used smokeless tobacco.           Family History   Problem Relation Age of Onset   • Hypertension Mother    • Diabetes Mother    • Diabetes Father    • No Known Problems Sister    • No Known Problems Brother    • No Known Problems Son    • No Known Problems Daughter    • No Known Problems Maternal Grandmother    • No Known Problems Maternal Grandfather    • No Known Problems Paternal Grandmother    • No Known Problems Paternal Grandfather    • No Known Problems Cousin        Review of Systems    Patient reports that she is not currently experiencing any symptoms  "of urinary incontinence.      noTESTED FOR CHLAMYDIA?    EXAM:  /70   Ht 154.9 cm (61\")   Wt 73 kg (161 lb)   LMP 06/07/2022   Breastfeeding No   BMI 30.42 kg/m²     Labs:   Lab Results (last 24 hours)     Procedure Component Value Units Date/Time    POC Urinalysis Dipstick [[id number]] Collected: 06/15/22 0923    Specimen: Urine Updated: 06/15/22 0925     Color Yellow     Clarity, UA Clear     Glucose, UA Negative mg/dL      Bilirubin Negative     Ketones, UA Negative     Specific Gravity  1.005     Blood, UA Negative     pH, Urine 5.0     Protein, POC Negative mg/dL      Urobilinogen, UA Normal     Leukocytes Negative     Nitrite, UA Negative    POC Pregnancy, Urine [[id number]] Collected: 06/15/22 0923    Specimen: Urine Updated: 06/15/22 0924     HCG, Urine, QL Negative     Lot Number [id number]     Internal Positive Control Passed     Internal Negative Control Passed     Expiration Date 7/31/23          Physical Exam  Vitals and nursing note reviewed. Exam conducted with a chaperone present.   Constitutional:       General: She is not in acute distress.     Appearance: She is well-developed. She is not diaphoretic.   HENT:      Head: Normocephalic and atraumatic.      Nose: Nose normal.   Eyes:      Extraocular Movements: Extraocular movements intact.   Cardiovascular:      Rate and Rhythm: Normal rate.   Pulmonary:      Effort: Pulmonary effort is normal.   Chest:   Breasts: Breasts are symmetrical.      Right: Normal. No mass, nipple discharge, skin change, tenderness or axillary adenopathy.      Left: Normal. No mass, nipple discharge, skin change, tenderness or axillary adenopathy.       Abdominal:      General: There is no distension.      Palpations: Abdomen is soft. There is no mass.      Tenderness: There is no abdominal tenderness. There is no guarding.   Genitourinary:     General: Normal vulva.      Pubic Area: No rash.       Vagina: Normal. No vaginal discharge.      Cervix: Normal.      " Uterus: Normal.       Adnexa: Right adnexa normal and left adnexa normal.   Musculoskeletal:         General: No tenderness or deformity. Normal range of motion.      Cervical back: Normal range of motion.   Lymphadenopathy:      Upper Body:      Right upper body: No axillary adenopathy.      Left upper body: No axillary adenopathy.   Skin:     General: Skin is warm and dry.      Coloration: Skin is not pale.      Findings: No erythema or rash.   Neurological:      Mental Status: She is alert and oriented to person, place, and time.   Psychiatric:         Behavior: Behavior normal.         Thought Content: Thought content normal.         Judgment: Judgment normal.            As part of wellness and prevention, the following topics were discussed with the patient: healthy weight, substance abuse/misuse, mental health, encouraging self breast exam, and other counseling and guidance done:  Nutrition, physical activity, healthy weight, injury prevention, misuse of tobacco, alcohol and drugs, sexual behavior and STDs, contraception, dental health, mental health, immunizations breast cancer screening and exams.    I saw the patient with a face mask, gloves and eye protection  The patient herself was masked.  Social distancing was observed as appropriate. All COVID precautions observed.  Pt encouraged to receive COVID vaccine if she hadn't already done this.     Assessment     1) GYN annual well woman exam.   2) PAP done today? Yes  3) problems addressed: none             Plan       Follow up prn or one year.    Pt instructed to call for results of any testing done today and that failure to call if she has not heard from us could result in a bad outcome.  Pt verbalized her understanding.     Diagnoses and all orders for this visit:    1. Pap smear, low-risk (Primary)  -     POC Urinalysis Dipstick  -     POC Pregnancy, Urine  -     IGP, Rfx Aptima HPV ASCU    2. Routine gynecological examination  -     POC Urinalysis  Dipstick  -     POC Pregnancy, Urine  -     IGP, Rfx Aptima HPV ASCU    3. Well woman exam    4. hx: LGSIL on Pap smear of cervix        RTO Return in about 1 year (around 6/15/2023) for Annual physical.        Kai Burroughs MD  [unfilled]  09:43 EDT

## 2022-06-19 LAB
CONV .: NORMAL
CYTOLOGIST CVX/VAG CYTO: NORMAL
CYTOLOGY CVX/VAG DOC CYTO: NORMAL
CYTOLOGY CVX/VAG DOC THIN PREP: NORMAL
DX ICD CODE: NORMAL
HIV 1 & 2 AB SER-IMP: NORMAL
Lab: NORMAL
OTHER STN SPEC: NORMAL
STAT OF ADQ CVX/VAG CYTO-IMP: NORMAL

## 2022-11-09 ENCOUNTER — TRANSCRIBE ORDERS (OUTPATIENT)
Dept: ADMINISTRATIVE | Facility: HOSPITAL | Age: 27
End: 2022-11-09

## 2022-11-09 DIAGNOSIS — R74.8 ELEVATED LIVER ENZYMES: Primary | ICD-10-CM

## 2022-11-21 ENCOUNTER — HOSPITAL ENCOUNTER (OUTPATIENT)
Dept: ULTRASOUND IMAGING | Facility: HOSPITAL | Age: 27
Discharge: HOME | End: 2022-11-21
Admitting: FAMILY MEDICINE

## 2022-11-21 DIAGNOSIS — R74.8 ELEVATED LIVER ENZYMES: ICD-10-CM

## 2022-11-21 PROCEDURE — 76705 ECHO EXAM OF ABDOMEN: CPT

## 2023-04-12 ENCOUNTER — TELEPHONE (OUTPATIENT)
Dept: OBSTETRICS AND GYNECOLOGY | Facility: CLINIC | Age: 28
End: 2023-04-12

## 2023-04-12 NOTE — TELEPHONE ENCOUNTER
Caller: Miriam Riley    Relationship: Self    Best call back number: [phone]    What was the call regarding: PT CALLED IN AND STATED HER LMP: 02/14. PT HAS BEEN TAKING PREGNANCY TESTS AND GOTTEN NEGATIVE RESULTS.     PT REQUESTING LAB WORK.     Do you require a callback: YES

## 2023-07-09 PROBLEM — N97.0 INFERTILITY, ANOVULATION: Status: ACTIVE | Noted: 2023-07-09

## 2023-07-09 PROBLEM — R63.8 INCREASED BMI: Status: ACTIVE | Noted: 2023-07-09

## 2023-07-09 PROBLEM — E28.2 PCOS (POLYCYSTIC OVARIAN SYNDROME): Status: ACTIVE | Noted: 2023-07-09

## 2023-07-09 PROBLEM — E88.81 INSULIN RESISTANCE: Status: ACTIVE | Noted: 2023-07-09

## 2023-07-09 PROBLEM — N91.5 OLIGOMENORRHEA: Status: ACTIVE | Noted: 2023-07-09

## 2023-07-09 PROBLEM — E88.819 INSULIN RESISTANCE: Status: ACTIVE | Noted: 2023-07-09

## 2023-07-09 PROBLEM — E34.9 FEMALE HYPERTESTOSTERONEMIA: Status: ACTIVE | Noted: 2023-07-09

## 2023-09-08 ENCOUNTER — OFFICE VISIT (OUTPATIENT)
Dept: OBSTETRICS AND GYNECOLOGY | Facility: CLINIC | Age: 28
End: 2023-09-08
Payer: COMMERCIAL

## 2023-09-08 VITALS
BODY MASS INDEX: 31.42 KG/M2 | SYSTOLIC BLOOD PRESSURE: 118 MMHG | WEIGHT: 166.4 LBS | DIASTOLIC BLOOD PRESSURE: 76 MMHG | HEIGHT: 61 IN

## 2023-09-08 DIAGNOSIS — E28.2 PCOS (POLYCYSTIC OVARIAN SYNDROME): Primary | ICD-10-CM

## 2023-09-08 DIAGNOSIS — N92.6 MENSTRUAL CYCLE PROBLEM: ICD-10-CM

## 2023-09-08 LAB
B-HCG UR QL: NEGATIVE
BILIRUB BLD-MCNC: NEGATIVE MG/DL
CLARITY, POC: CLEAR
COLOR UR: YELLOW
EXPIRATION DATE: NORMAL
GLUCOSE UR STRIP-MCNC: NEGATIVE MG/DL
INTERNAL NEGATIVE CONTROL: NEGATIVE
INTERNAL POSITIVE CONTROL: POSITIVE
KETONES UR QL: NEGATIVE
LEUKOCYTE EST, POC: NEGATIVE
Lab: 55
NITRITE UR-MCNC: NEGATIVE MG/ML
PH UR: 5 [PH] (ref 5–8)
PROT UR STRIP-MCNC: NEGATIVE MG/DL
RBC # UR STRIP: NEGATIVE /UL
SP GR UR: 1 (ref 1–1.03)
UROBILINOGEN UR QL: NORMAL

## 2023-09-08 NOTE — PROGRESS NOTES
"Subjective     Chief Complaint   Patient presents with    Menstrual Problem     PIP her labs and u/s and hx indicate she has PCOS.  Will discuss plan when she comes back into the office. Per Dr Burroughs       Miriam Riley is a 28 y.o.  whose LMP is Patient's last menstrual period was 2023 (exact date).. This patient is new to me - yes.  She presents with f/u PCOS     HPI    Recent labs and pelvic US indicate PCOS.  Declined ocp to regulate her cycles.  Currently on Metformin 500 mg BID.  Considering child-bearing in the future.   Strong family hx of diabetes.        The following portions of the patient's history were reviewed and updated as appropriate:vital signs, allergies, current medications, past medical history, past social history, past surgical history, and problem list      Review of Systems     Review of Systems   Genitourinary:  Positive for menstrual problem and pelvic pain (occasional).     Objective      /76   Ht 154.9 cm (60.98\")   Wt 75.5 kg (166 lb 6.4 oz)   LMP 2023 (Exact Date)   BMI 31.46 kg/m²     Physical Exam    Physical Exam  Vitals reviewed.   Constitutional:       General: She is awake. She is not in acute distress.     Appearance: She is not ill-appearing.   Eyes:      Conjunctiva/sclera: Conjunctivae normal.   Pulmonary:      Effort: Pulmonary effort is normal. No respiratory distress.   Musculoskeletal:      Cervical back: Neck supple. No rigidity.   Skin:     General: Skin is warm and dry.      Capillary Refill: Capillary refill takes less than 2 seconds.   Neurological:      Mental Status: She is alert and oriented to person, place, and time.   Psychiatric:         Mood and Affect: Mood and affect normal.         Behavior: Behavior normal.       Lab Review   Labs: Urine pregnancy test, Urinalysis - with micro     Imaging   Ultrasound - Pelvic Vaginal    Assessment  Diagnoses and all orders for this visit:    1. PCOS (polycystic ovarian syndrome) " (Primary)  -     Ambulatory Referral to Endocrinology    2. Menstrual cycle problem  -     POC Urinalysis Dipstick  -     POC Pregnancy, Urine    All questions answered.  Referred to Endocrinology for further E/T.      Return if symptoms worsen or fail to improve.    I spent 30 minutes caring for Miriam on this date of service. This time includes time spent by me in the following activities: preparing for the visit, reviewing tests, obtaining and/or reviewing a separately obtained history, performing a medically appropriate examination and/or evaluation, counseling and educating the patient/family/caregiver, ordering medications, tests, or procedures, referring and communicating with other health care professionals, and documenting information in the medical record     Trish Zuniga, APRN  9/8/2023  13:31 EDT

## 2024-03-26 ENCOUNTER — OFFICE VISIT (OUTPATIENT)
Dept: ENDOCRINOLOGY | Age: 29
End: 2024-03-26
Payer: COMMERCIAL

## 2024-03-26 ENCOUNTER — TELEPHONE (OUTPATIENT)
Dept: ENDOCRINOLOGY | Age: 29
End: 2024-03-26

## 2024-03-26 VITALS
HEIGHT: 61 IN | DIASTOLIC BLOOD PRESSURE: 72 MMHG | OXYGEN SATURATION: 98 % | SYSTOLIC BLOOD PRESSURE: 116 MMHG | TEMPERATURE: 97.6 F | HEART RATE: 90 BPM | WEIGHT: 163 LBS | BODY MASS INDEX: 30.78 KG/M2

## 2024-03-26 DIAGNOSIS — E28.2 PCOS (POLYCYSTIC OVARIAN SYNDROME): Primary | ICD-10-CM

## 2024-03-26 PROCEDURE — 99203 OFFICE O/P NEW LOW 30 MIN: CPT | Performed by: INTERNAL MEDICINE

## 2024-03-26 RX ORDER — ERGOCALCIFEROL 1.25 MG/1
1 CAPSULE ORAL WEEKLY
COMMUNITY
Start: 2024-03-25

## 2024-03-26 NOTE — PROGRESS NOTES
Referring provider: JAY Main     Chief complaint/Reason for consult: PCOS    HPI:   - 28 year old here for PCOS  - She states she had missed a menstrual cycle and that is what prompted her to be evaluated for PCOS  - She states her menstrual cycles are more regular now  - She was started on metformin 500 mg bid  - Denies unwanted hair growth on her face or body    The following portions of the patient's history were reviewed and updated as appropriate: allergies, current medications, past family history, past medical history, past social history, past surgical history, and problem list.    Objective     Vitals:    03/26/24 1553   BP: 116/72   Pulse: 90   Temp: 97.6 °F (36.4 °C)   SpO2: 98%        Physical Exam  Vitals reviewed.   Constitutional:       Appearance: Normal appearance.   HENT:      Head: Normocephalic and atraumatic.   Eyes:      General: No scleral icterus.  Pulmonary:      Effort: Pulmonary effort is normal. No respiratory distress.   Neurological:      Mental Status: She is alert.      Gait: Gait normal.   Psychiatric:         Mood and Affect: Mood normal.         Behavior: Behavior normal.         Thought Content: Thought content normal.         Judgment: Judgment normal.         Labs/Imaging:  Reviewed her lab work and ultrasound which is consistent with PCOS    Assessment & Plan   PCOS  - Discussed that weight loss can result in improvement of PCOS overall  - Cont. Metformin 500 mg bid  - Patient does not want or does not have symptoms that warrant OCP's or spironolactone    - Return to clinic PRN

## 2024-03-26 NOTE — TELEPHONE ENCOUNTER
Hub staff attempted to follow warm transfer process and was unsuccessful     Caller: Miriam Riley    Relationship to patient: Self    Best call back number:     [phone]       Patient is needing PT CALLED WANTING TO TALK WITH DR. RAMÍREZ BEFORE COMING INTO  APT TODAY. PT STATED THAT IT IS SOMETHING PERSONAL THAT HSE IS NEEDING TO SPEAK WITH HIM ABOUT . PLEASE ADVISE.

## 2024-03-26 NOTE — TELEPHONE ENCOUNTER
Called and spoke with pt. Pt wanted to inform us that she started her period this morning and wanted to ensure it is still okay to come to her appointment. I informed the pt that she is still okay to come her appointment today. Pt had no further questions or concerns.

## 2024-03-28 ENCOUNTER — PATIENT ROUNDING (BHMG ONLY) (OUTPATIENT)
Dept: ENDOCRINOLOGY | Age: 29
End: 2024-03-28
Payer: COMMERCIAL

## 2024-07-26 ENCOUNTER — OFFICE VISIT (OUTPATIENT)
Dept: OBSTETRICS AND GYNECOLOGY | Facility: CLINIC | Age: 29
End: 2024-07-26
Payer: COMMERCIAL

## 2024-07-26 VITALS
SYSTOLIC BLOOD PRESSURE: 110 MMHG | DIASTOLIC BLOOD PRESSURE: 64 MMHG | BODY MASS INDEX: 29.27 KG/M2 | WEIGHT: 155 LBS | HEIGHT: 61 IN

## 2024-07-26 DIAGNOSIS — Z01.419 ROUTINE GYNECOLOGICAL EXAMINATION: ICD-10-CM

## 2024-07-26 DIAGNOSIS — Z01.419 PAP SMEAR, AS PART OF ROUTINE GYNECOLOGICAL EXAMINATION: Primary | ICD-10-CM

## 2024-07-26 DIAGNOSIS — Z31.69 ENCOUNTER FOR PRECONCEPTION CONSULTATION: ICD-10-CM

## 2024-07-26 DIAGNOSIS — E28.2 PCOS (POLYCYSTIC OVARIAN SYNDROME): ICD-10-CM

## 2024-07-26 DIAGNOSIS — Z76.89 ENCOUNTER TO ESTABLISH CARE WITH NEW DOCTOR: ICD-10-CM

## 2024-07-26 LAB
B-HCG UR QL: NEGATIVE
BILIRUB BLD-MCNC: NEGATIVE MG/DL
CLARITY, POC: CLEAR
COLOR UR: YELLOW
EXPIRATION DATE: NORMAL
GLUCOSE UR STRIP-MCNC: NEGATIVE MG/DL
INTERNAL NEGATIVE CONTROL: NORMAL
INTERNAL POSITIVE CONTROL: NORMAL
KETONES UR QL: NEGATIVE
LEUKOCYTE EST, POC: NEGATIVE
Lab: NORMAL
NITRITE UR-MCNC: NEGATIVE MG/ML
PH UR: 5 [PH] (ref 5–8)
PROT UR STRIP-MCNC: NEGATIVE MG/DL
RBC # UR STRIP: NEGATIVE /UL
SP GR UR: 1.03 (ref 1–1.03)
UROBILINOGEN UR QL: NORMAL

## 2024-07-26 NOTE — PROGRESS NOTES
"GYN Annual Exam     CC- Here for annual exam.     Miriam Riley is a 28 y.o. female established patient of practice who is new to me who presents for annual well woman exam. Periods are regular every 28-30 days, lasting 3 days. She has a known dx of PCOS and is seeing endocrinology and is on metformin 500 mg BID and her cycles are now regular. She has been  7 years and has never used birth control. She has 2 stepkids and would like to be pregnant if she could have a girl. She is not interested in ART. She is \"okay if it happens\" but does not want any further evaluation at this time.     OB History          0    Para   0    Term   0       0    AB   0    Living   0         SAB   0    IAB   0    Ectopic   0    Molar   0    Multiple   0    Live Births   0          Obstetric Comments   Desires pregnancy               Menarche: 10  Current contraception: none  History of abnormal Pap smear: yes -  1 abnormal with normal followup  History of abnormal mammogram: no  Family history of uterine, colon or ovarian cancer: no  Family history of breast cancer: yes - p cousin < 50   H/o STDs: none  Last pap:2023- nl pap  Gardasil:completed  JAMIL: none  PCOS  infertility    Health Maintenance   Topic Date Due    BMI FOLLOWUP  Never done    TDAP/TD VACCINES (1 - Tdap) Never done    HEPATITIS C SCREENING  Never done    ANNUAL PHYSICAL  Never done    COVID-19 Vaccine (2023- season) Never done    Annual Gynecologic Pelvic and Breast Exam  2024    INFLUENZA VACCINE  2024    PAP SMEAR  2026    Pneumococcal Vaccine 0-64  Aged Out       Past Medical History:   Diagnosis Date    Abnormal Pap smear of cervix     Infertility, anovulation 2023    PCOS (polycystic ovarian syndrome) 2023    Polycystic ovary syndrome     Varicella        Past Surgical History:   Procedure Laterality Date    SALIVARY GLAND EXCISION           Current Outpatient Medications:     cetirizine (zyrTEC) 10 MG " tablet, Take 1 tablet by mouth Daily., Disp: , Rfl:     metFORMIN (GLUCOPHAGE) 500 MG tablet, Take 1 tablet by mouth 2 (Two) Times a Day With Meals., Disp: 90 tablet, Rfl: 6    vitamin D (ERGOCALCIFEROL) 1.25 MG (78462 UT) capsule capsule, Take 1 capsule by mouth 1 (One) Time Per Week., Disp: , Rfl:     vitamin D3 125 MCG (5000 UT) capsule capsule, , Disp: , Rfl:     No Known Allergies    Social History     Tobacco Use    Smoking status: Never    Smokeless tobacco: Never   Vaping Use    Vaping status: Never Used   Substance Use Topics    Alcohol use: No    Drug use: No       Family History   Problem Relation Age of Onset    Diabetes Father     Hypertension Mother     Diabetes Mother     No Known Problems Brother     No Known Problems Sister     No Known Problems Son     No Known Problems Daughter     No Known Problems Paternal Grandfather     No Known Problems Paternal Grandmother     No Known Problems Maternal Grandmother     No Known Problems Maternal Grandfather     Breast cancer Cousin     Ovarian cancer Neg Hx     Uterine cancer Neg Hx     Colon cancer Neg Hx     Deep vein thrombosis Neg Hx     Pulmonary embolism Neg Hx        Review of Systems   Constitutional:  Negative for appetite change, fatigue, fever and unexpected weight change.   Eyes:  Negative for photophobia and visual disturbance.   Respiratory:  Negative for cough and shortness of breath.    Cardiovascular:  Negative for chest pain and palpitations.   Gastrointestinal:  Negative for abdominal distention, abdominal pain, constipation, diarrhea and nausea.   Endocrine: Negative for cold intolerance and heat intolerance.   Genitourinary:  Negative for dyspareunia, dysuria, menstrual problem, pelvic pain and vaginal discharge.   Musculoskeletal:  Negative for back pain.   Skin:  Negative for color change and rash.   Neurological:  Negative for headaches.   Hematological:  Negative for adenopathy. Does not bruise/bleed easily.   Psychiatric/Behavioral:  " Negative for dysphoric mood. The patient is not nervous/anxious.        /64   Ht 154.9 cm (60.98\")   Wt 70.3 kg (155 lb)   LMP 07/12/2024   BMI 29.31 kg/m²     Physical Exam  Vitals and nursing note reviewed. Exam conducted with a chaperone present.   Constitutional:       Appearance: Normal appearance. She is well-developed and normal weight.   HENT:      Head: Normocephalic and atraumatic.   Eyes:      General: No scleral icterus.     Conjunctiva/sclera: Conjunctivae normal.   Neck:      Thyroid: No thyromegaly.   Cardiovascular:      Rate and Rhythm: Normal rate and regular rhythm.   Pulmonary:      Effort: Pulmonary effort is normal.      Breath sounds: Normal breath sounds.   Chest:   Breasts:     Right: No swelling, bleeding, inverted nipple, mass, nipple discharge, skin change or tenderness.      Left: No swelling, bleeding, inverted nipple, mass, nipple discharge, skin change or tenderness.   Abdominal:      General: Bowel sounds are normal. There is no distension.      Palpations: Abdomen is soft. There is no mass.      Tenderness: There is no abdominal tenderness. There is no guarding or rebound.      Hernia: No hernia is present.   Genitourinary:     Exam position: Supine.      Labia:         Right: No rash, tenderness, lesion or injury.         Left: No rash, tenderness, lesion or injury.       Urethra: No prolapse, urethral pain, urethral swelling or urethral lesion.      Vagina: No signs of injury and foreign body. No vaginal discharge, erythema, tenderness or bleeding.      Cervix: No cervical motion tenderness, discharge or friability.      Uterus: Not deviated, not enlarged, not fixed and not tender.       Adnexa:         Right: No mass, tenderness or fullness.          Left: No mass, tenderness or fullness.     Musculoskeletal:      Cervical back: Neck supple.   Skin:     General: Skin is warm and dry.   Neurological:      Mental Status: She is alert and oriented to person, place, and " time.   Psychiatric:         Behavior: Behavior normal.         Thought Content: Thought content normal.         Judgment: Judgment normal.            Assessment/Plan    1) GYN HM: pap  SBE demonstrated and encouraged.  2) STD screening: declines Condoms encouraged.  3) Contraception: none  4) We discussed a healthy lifestyle before pregnancy, including avoidance of tobacco, alcohol and drugs.  We reviewed her prescription medications.  She does not have any hereditary disease or birth defects in her or her partner’s family.  I recommend she get a flu shot yearly and take folic acid daily.  She was offered testing for a type and screen and a varicella and a rubella IgG and she did accept.   She was also offered pre conceptual screening for SMA, FX and CF and did not accept. We also discussed expanded carrier screening and she did not accept.   5) Diet and Exercise discussed  6) Smoking Status: No  7) Social: , 2 stepsons  8) MMG- plan age 39  9)PCOS- on metformin BID, sees endocrinology  10) Follow up prn or 1 year annual        Diagnoses and all orders for this visit:    1. Pap smear, as part of routine gynecological examination (Primary)  -     Pap IG, Rfx HPV ASCU    2. Routine gynecological examination  -     POC Urinalysis Dipstick  -     POC Pregnancy, Urine  -     Pap IG, Rfx HPV ASCU    3. Encounter for preconception consultation  -     Rubella Antibody, IgG  -     Varicella Zoster Antibody, IgG  -     ABO / Rh  -     Antibody Screen    4. PCOS (polycystic ovarian syndrome)    5. Encounter to establish care with new doctor          Sapna Sherman MD  07/26/2024    14:53 EDT

## 2024-07-27 LAB
ABO GROUP BLD: NORMAL
BLD GP AB SCN SERPL QL: NEGATIVE
RH BLD: POSITIVE
RUBV IGG SERPL IA-ACNC: 4.41 INDEX
VZV IGG SER IA-ACNC: 2637 INDEX

## 2024-08-02 LAB
CONV .: ABNORMAL
CYTOLOGIST CVX/VAG CYTO: ABNORMAL
CYTOLOGY CVX/VAG DOC CYTO: ABNORMAL
CYTOLOGY CVX/VAG DOC THIN PREP: ABNORMAL
DX ICD CODE: ABNORMAL
DX ICD CODE: ABNORMAL
HPV I/H RISK 4 DNA CVX QL PROBE+SIG AMP: POSITIVE
Lab: ABNORMAL
OTHER STN SPEC: ABNORMAL
PATHOLOGIST CVX/VAG CYTO: ABNORMAL
STAT OF ADQ CVX/VAG CYTO-IMP: ABNORMAL

## 2024-08-29 ENCOUNTER — OFFICE VISIT (OUTPATIENT)
Dept: OBSTETRICS AND GYNECOLOGY | Facility: CLINIC | Age: 29
End: 2024-08-29
Payer: COMMERCIAL

## 2024-08-29 VITALS
BODY MASS INDEX: 31.02 KG/M2 | DIASTOLIC BLOOD PRESSURE: 70 MMHG | SYSTOLIC BLOOD PRESSURE: 110 MMHG | WEIGHT: 158 LBS | HEIGHT: 60 IN

## 2024-08-29 DIAGNOSIS — Z13.89 SCREENING FOR GENITOURINARY CONDITION: ICD-10-CM

## 2024-08-29 DIAGNOSIS — R87.810 ASCUS WITH POSITIVE HIGH RISK HPV CERVICAL: Primary | ICD-10-CM

## 2024-08-29 DIAGNOSIS — R87.610 ASCUS WITH POSITIVE HIGH RISK HPV CERVICAL: Primary | ICD-10-CM

## 2024-08-29 NOTE — PROGRESS NOTES
Colposcopy Procedure Note    Procedures          Indications: Most recent Pap smear showed:     7/2024- ASCUS with POSITIVE high risk HPV  6/2023- normal pap  6/2022- normal pap  6/2021- ASCUS + HPV  11/2020- ASCUS + HPV  11/2019- LGSIL  2018- ASCUS + HPV    Prior cervical/vaginal disease: normal exam without visible pathology  Prior cervical treatment: no treatment.  Contraception: none    Procedure Details   The risks and benefits of the procedure and Verbal informed consent obtained.  Pregnancy test: negative    Speculum placed in vagina and excellent visualization of cervix achieved, cervix swabbed x 3 with acetic acid solution and Lugol's solution     Findings:  Cervix: no punctation, no abnormal vasculature, and acetowhite lesion(s) noted at 6 & 12 o'clock; cervix swabbed with Lugol's solution and cervical biopsies taken at 6 & 12 o'clock.  Vaginal inspection: vaginal colposcopy not performed.  Vulvar colposcopy: vulvar colposcopy not performed.  Colpo adequacy: was adquate    Specimens: 6, 12, ECC    Colpo impression: normal to JOCELINE 1    Complications: none.   Patient tolerated procedure well.     Smoking Status: No      Plan:  Specimens labelled and sent to Pathology.  Will base further treatment on Pathology findings.  Treatment options discussed with patient.  Post biopsy instructions given to patient.    Sapna Sherman MD   9/15/2024  16:59 EDT

## 2024-09-04 LAB
PATH REPORT.FINAL DX SPEC: NORMAL
PATH REPORT.GROSS SPEC: NORMAL
PATH REPORT.SITE OF ORIGIN SPEC: NORMAL
PATHOLOGIST NAME: NORMAL
PAYMENT PROCEDURE: NORMAL

## 2025-07-31 ENCOUNTER — OFFICE VISIT (OUTPATIENT)
Dept: OBSTETRICS AND GYNECOLOGY | Facility: CLINIC | Age: 30
End: 2025-07-31
Payer: COMMERCIAL

## 2025-07-31 VITALS
DIASTOLIC BLOOD PRESSURE: 78 MMHG | SYSTOLIC BLOOD PRESSURE: 116 MMHG | WEIGHT: 161.8 LBS | BODY MASS INDEX: 30.55 KG/M2 | HEIGHT: 61 IN

## 2025-07-31 DIAGNOSIS — Z01.419 ROUTINE GYNECOLOGICAL EXAMINATION: Primary | ICD-10-CM

## 2025-07-31 DIAGNOSIS — E28.2 PCOS (POLYCYSTIC OVARIAN SYNDROME): ICD-10-CM

## 2025-07-31 DIAGNOSIS — Z11.51 SCREENING FOR HUMAN PAPILLOMAVIRUS (HPV): ICD-10-CM

## 2025-07-31 LAB
B-HCG UR QL: NEGATIVE
BILIRUB BLD-MCNC: NEGATIVE MG/DL
CLARITY, POC: CLEAR
COLOR UR: YELLOW
EXPIRATION DATE: NORMAL
GLUCOSE UR STRIP-MCNC: NEGATIVE MG/DL
INTERNAL NEGATIVE CONTROL: NEGATIVE
INTERNAL POSITIVE CONTROL: POSITIVE
KETONES UR QL: ABNORMAL
LEUKOCYTE EST, POC: NEGATIVE
Lab: NORMAL
NITRITE UR-MCNC: NEGATIVE MG/ML
PH UR: 5 [PH] (ref 5–8)
PROT UR STRIP-MCNC: ABNORMAL MG/DL
RBC # UR STRIP: ABNORMAL /UL
SP GR UR: 1 (ref 1–1.03)
UROBILINOGEN UR QL: NORMAL

## 2025-07-31 PROCEDURE — 81002 URINALYSIS NONAUTO W/O SCOPE: CPT | Performed by: OBSTETRICS & GYNECOLOGY

## 2025-07-31 PROCEDURE — 81025 URINE PREGNANCY TEST: CPT | Performed by: OBSTETRICS & GYNECOLOGY

## 2025-07-31 PROCEDURE — 99395 PREV VISIT EST AGE 18-39: CPT | Performed by: OBSTETRICS & GYNECOLOGY

## 2025-07-31 NOTE — PROGRESS NOTES
"GYN Annual Exam     CC- Here for annual exam.     Miriam Riley is a 29 y.o. female est pt who presents for annual well woman exam. Periods are regular every 28-30 days, lasting 3 days. She has a known dx of PCOS and is seeing endocrinology and is on metformin 500 mg BID and her cycles are now regular. She has been  7 years and has never used birth control. She has 2 stepkids and would like to be pregnant if she could have a girl. Her stepsons are in 7th and 8th grade. She is not interested in ART. She is \"okay if it happens\" but does not want any further evaluation at this time.  Last year she had an ASCUS positive HPV Pap and her colposcopic biopsies were negative.  She did not show up for 6-month repeat Pap so we will do that today. She is on her cycle.     OB History          0    Para   0    Term   0       0    AB   0    Living   0         SAB   0    IAB   0    Ectopic   0    Molar   0    Multiple   0    Live Births   0          Obstetric Comments   Desires pregnancy               Menarche: 10  Current contraception: none  History of abnormal Pap smear: intermittent abnls.   History of abnormal mammogram: no  Family history of uterine, colon or ovarian cancer: no  Family history of breast cancer: yes - p cousin < 50   H/o STDs: none  Last pap:2024- ASCUS + HPV, bx neg  Gardasil:completed  JAMIL: none  PCOS  infertility    Health Maintenance   Topic Date Due    TDAP/TD VACCINES (1 - Tdap) Never done    HEPATITIS C SCREENING  Never done    ANNUAL PHYSICAL  Never done    COVID-19 Vaccine (2024- season) Never done    Annual Gynecologic Pelvic and Breast Exam  2025    INFLUENZA VACCINE  10/01/2025    PAP SMEAR  2027    Pneumococcal Vaccine 0-49  Aged Out       Past Medical History:   Diagnosis Date    Abnormal Pap smear of cervix     Infertility, anovulation 2023    PCOS (polycystic ovarian syndrome) 2023    Polycystic ovary syndrome     Varicella        Past " Surgical History:   Procedure Laterality Date    SALIVARY GLAND EXCISION           Current Outpatient Medications:     metFORMIN (GLUCOPHAGE) 500 MG tablet, Take 1 tablet by mouth 2 (Two) Times a Day With Meals., Disp: 90 tablet, Rfl: 6    No Known Allergies    Social History     Tobacco Use    Smoking status: Never     Passive exposure: Never    Smokeless tobacco: Never   Vaping Use    Vaping status: Never Used   Substance Use Topics    Alcohol use: No    Drug use: No       Family History   Problem Relation Age of Onset    Hypertension Mother     Diabetes Mother     Diabetes Father     No Known Problems Sister     No Known Problems Brother     No Known Problems Son     No Known Problems Daughter     No Known Problems Maternal Grandmother     No Known Problems Maternal Grandfather     No Known Problems Paternal Grandmother     No Known Problems Paternal Grandfather     Breast cancer Cousin     Ovarian cancer Neg Hx     Uterine cancer Neg Hx     Colon cancer Neg Hx     Deep vein thrombosis Neg Hx     Pulmonary embolism Neg Hx        Review of Systems   Constitutional:  Negative for appetite change, fatigue, fever and unexpected weight change.   Eyes:  Negative for photophobia and visual disturbance.   Respiratory:  Negative for cough and shortness of breath.    Cardiovascular:  Negative for chest pain and palpitations.   Gastrointestinal:  Negative for abdominal distention, abdominal pain, constipation, diarrhea and nausea.   Endocrine: Negative for cold intolerance and heat intolerance.   Genitourinary:  Positive for vaginal bleeding. Negative for dyspareunia, dysuria, menstrual problem, pelvic pain and vaginal discharge.   Musculoskeletal:  Negative for back pain.   Skin:  Negative for color change and rash.   Neurological:  Negative for headaches.   Hematological:  Negative for adenopathy. Does not bruise/bleed easily.   Psychiatric/Behavioral:  Negative for dysphoric mood. The patient is not nervous/anxious.   "      /78   Ht 154.9 cm (60.98\")   Wt 73.4 kg (161 lb 12.8 oz)   LMP 07/28/2025 (Exact Date)   BMI 30.59 kg/m²     Physical Exam  Vitals and nursing note reviewed. Exam conducted with a chaperone present.   Constitutional:       Appearance: Normal appearance. She is well-developed and normal weight.   HENT:      Head: Normocephalic and atraumatic.   Eyes:      General: No scleral icterus.     Conjunctiva/sclera: Conjunctivae normal.   Neck:      Thyroid: No thyromegaly.   Cardiovascular:      Rate and Rhythm: Normal rate and regular rhythm.   Pulmonary:      Effort: Pulmonary effort is normal.      Breath sounds: Normal breath sounds.   Chest:   Breasts:     Right: No swelling, bleeding, inverted nipple, mass, nipple discharge, skin change or tenderness.      Left: No swelling, bleeding, inverted nipple, mass, nipple discharge, skin change or tenderness.   Abdominal:      General: Bowel sounds are normal. There is no distension.      Palpations: Abdomen is soft. There is no mass.      Tenderness: There is no abdominal tenderness. There is no guarding or rebound.      Hernia: No hernia is present.   Genitourinary:     Exam position: Supine.      Labia:         Right: No rash, tenderness, lesion or injury.         Left: No rash, tenderness, lesion or injury.       Urethra: No prolapse, urethral pain, urethral swelling or urethral lesion.      Vagina: No signs of injury and foreign body. Bleeding present. No vaginal discharge, erythema or tenderness.      Cervix: No cervical motion tenderness, discharge or friability.      Uterus: Not deviated, not enlarged, not fixed and not tender.       Adnexa:         Right: No mass, tenderness or fullness.          Left: No mass, tenderness or fullness.     Musculoskeletal:      Cervical back: Neck supple.   Skin:     General: Skin is warm and dry.   Neurological:      Mental Status: She is alert and oriented to person, place, and time.   Psychiatric:         Behavior: " Behavior normal.         Thought Content: Thought content normal.         Judgment: Judgment normal.            Assessment/Plan    1) GYN HM: check pap/HPV.   SBE demonstrated and encouraged. Importance of f/u for abnl pap discussed.   2) STD screening: declines Condoms encouraged.  3) Contraception: none  4) We discussed a healthy lifestyle before pregnancy, including avoidance of tobacco, alcohol and drugs.  We reviewed her prescription medications.  She does not have any hereditary disease or birth defects in her or her partner’s family.  I recommend she get a flu shot yearly and take folic acid daily.  She is A+, RI and VI.   She was also offered pre conceptual screening for SMA, FX and CF and did not accept. We also discussed expanded carrier screening and she did not accept.   5) Diet and Exercise discussed  6) Smoking Status: No  7) Social: , 2 stepsons  8) MMG- plan age 39  9)PCOS- on metformin BID, sees endocrinology  10)Parts of this document have been copied or forwarded from her previous visits and have been reviewed, updated and edited as indicated.   11)  Follow up prn or 1 year annual        Diagnoses and all orders for this visit:    1. Routine gynecological examination (Primary)  -     POC Urinalysis Dipstick  -     POC Pregnancy, Urine  -     IGP, Apt HPV,rfx 16 / 18,45    2. Screening for human papillomavirus (HPV)  -     IGP, Apt HPV,rfx 16 / 18,45    3. PCOS (polycystic ovarian syndrome)          Sapna Sherman MD  07/31/2025    09:41 EDT

## 2025-08-04 LAB
CYTOLOGIST CVX/VAG CYTO: NORMAL
CYTOLOGY CVX/VAG DOC CYTO: NORMAL
CYTOLOGY CVX/VAG DOC THIN PREP: NORMAL
DX ICD CODE: NORMAL
HPV I/H RISK 4 DNA CVX QL PROBE+SIG AMP: NEGATIVE
OTHER STN SPEC: NORMAL
SERVICE CMNT-IMP: NORMAL
STAT OF ADQ CVX/VAG CYTO-IMP: NORMAL